# Patient Record
Sex: MALE | Race: WHITE | URBAN - METROPOLITAN AREA
[De-identification: names, ages, dates, MRNs, and addresses within clinical notes are randomized per-mention and may not be internally consistent; named-entity substitution may affect disease eponyms.]

---

## 2017-01-26 ENCOUNTER — TELEPHONE (OUTPATIENT)
Dept: INTERNAL MEDICINE | Facility: CLINIC | Age: 67
End: 2017-01-26

## 2017-01-26 NOTE — TELEPHONE ENCOUNTER
Panel Management Review      Patient has the following on his problem list:     Hypertension   Last three blood pressure readings:  BP Readings from Last 3 Encounters:   07/06/16 139/84   09/22/15 135/89   04/20/15 124/84     Blood pressure: Passed    HTN Guidelines:  Age 18-59 BP range:  Less than 140/90  Age 60-85 with Diabetes:  Less than 140/90  Age 60-85 without Diabetes:  less than 150/90      Composite cancer screening  Chart review shows that this patient is due/due soon for the following Colonoscopy  Summary:    Patient is due/failing the following:   COLONOSCOPY    Action needed:   Due for a colonoscopy    Type of outreach:    Sent letter.    Questions for provider review:    None                                                                                                                                    Sheela Hernandez ma       Chart routed to Care Team .

## 2017-01-26 NOTE — Clinical Note
January 26, 2017    Paulino Padilla  [id number] [address] [place] [id number]    Dear Paulino    We care about your health and have reviewed your health plan. We have reviewed your medical conditions, medication list, and lab results and are making recommendations based on this review, to better manage your health.    You are in particular need of attention regarding:  - Scheduling a Colon Cancer Screening (Colonoscopy only) [phone]      Here is a list of Health Maintenance topics that are due now or due soon:  Health Maintenance Due   Topic Date Due     HEPATITIS C SCREENING  04/28/1968     PNEUMO 5YR BOOST DUE AFTER AGE 65 (NO IB MSG) (1) 05/28/2010     PNEUMOCOCCAL (1 of 2 - PCV13) 04/28/2015     AORTIC ANEURYSM SCREENING (SYSTEM ASSIGNED)  04/28/2015     MICROALBUMIN Q1 YEAR( NO INBASKET)  05/28/2015     BMP Q1 YR (NO INBASKET)  04/20/2016     INFLUENZA VACCINE (SYSTEM ASSIGNED)  09/01/2016     COLON CANCER SCREEN (SYSTEM ASSIGNED)  11/13/2016     We will be calling you in the next couple of weeks to help you schedule any appointments that are needed.  Please call us at [phone] (or use [employer]) to address the above recommendations.     Thank you for trusting [facility] and we appreciate the opportunity to serve you.  We look forward to supporting your healthcare needs in the future.    Healthy Regards,    [facility]

## 2017-02-10 NOTE — TELEPHONE ENCOUNTER
Spoke to patient and he is in [place] until June and will call when he gets back if he decides to do one.

## 2017-05-18 ENCOUNTER — TELEPHONE (OUTPATIENT)
Dept: FAMILY MEDICINE | Facility: CLINIC | Age: 67
End: 2017-05-18

## 2017-05-18 NOTE — TELEPHONE ENCOUNTER
Panel Management Review      Patient has the following on his problem list:     Hypertension   Last three blood pressure readings:  BP Readings from Last 3 Encounters:   07/06/16 139/84   09/22/15 135/89   04/20/15 124/84     Blood pressure: Passed    HTN Guidelines:  Age 18-59 BP range:  Less than 140/90  Age 60-85 with Diabetes:  Less than 140/90  Age 60-85 without Diabetes:  less than 150/90      Composite cancer screening  Chart review shows that this patient is due/due soon for the following Colonoscopy  Summary:    Patient is due/failing the following:   COLONOSCOPY and PHYSICAL    Action needed:   None patient is in [place] until June he will call when he gets back.    Type of outreach:    None patient is in [place] until June he will call when he gets back.    Questions for provider review:    None                                                                                                                                    Sheela Hernandez ma       Chart routed to closing chart  .

## 2017-06-29 ENCOUNTER — TRANSFERRED RECORDS (OUTPATIENT)
Dept: HEALTH INFORMATION MANAGEMENT | Facility: CLINIC | Age: 67
End: 2017-06-29

## 2017-09-08 ENCOUNTER — TELEPHONE (OUTPATIENT)
Dept: FAMILY MEDICINE | Facility: CLINIC | Age: 67
End: 2017-09-08

## 2017-09-08 NOTE — TELEPHONE ENCOUNTER
I have received forms for this pt with pre visit order. Unsure if he needs those labs done at our clinic or not. Check with pt.   Forms are in team 2 Folder.     Shaila Feliz MD.   Family Physician.  [facility].

## 2017-09-11 NOTE — TELEPHONE ENCOUNTER
Was told per patient to disregard due to he had the labs completed at another clinic. OXANA Machado

## 2018-07-11 ENCOUNTER — TELEPHONE (OUTPATIENT)
Dept: FAMILY MEDICINE | Facility: CLINIC | Age: 68
End: 2018-07-11

## 2018-07-11 NOTE — TELEPHONE ENCOUNTER
Forms received from: [employer]   Phone number listed:    Fax listed:   Date received: 07/11/2018  Form description: cancer treatment  Once forms are completed, please return to [employer] via fax.  Form placed: in providers folder  Avelina Jerome

## 2019-10-21 ENCOUNTER — TRANSFERRED RECORDS (OUTPATIENT)
Dept: HEALTH INFORMATION MANAGEMENT | Facility: CLINIC | Age: 69
End: 2019-10-21

## 2020-02-23 ENCOUNTER — HEALTH MAINTENANCE LETTER (OUTPATIENT)
Age: 70
End: 2020-02-23

## 2020-12-06 ENCOUNTER — HEALTH MAINTENANCE LETTER (OUTPATIENT)
Age: 70
End: 2020-12-06

## 2021-04-11 ENCOUNTER — HEALTH MAINTENANCE LETTER (OUTPATIENT)
Age: 71
End: 2021-04-11

## 2021-09-25 ENCOUNTER — HEALTH MAINTENANCE LETTER (OUTPATIENT)
Age: 71
End: 2021-09-25

## 2022-05-07 ENCOUNTER — HEALTH MAINTENANCE LETTER (OUTPATIENT)
Age: 72
End: 2022-05-07

## 2023-04-22 ENCOUNTER — HEALTH MAINTENANCE LETTER (OUTPATIENT)
Age: 73
End: 2023-04-22

## 2023-06-02 ENCOUNTER — HEALTH MAINTENANCE LETTER (OUTPATIENT)
Age: 73
End: 2023-06-02